# Patient Record
Sex: MALE | URBAN - METROPOLITAN AREA
[De-identification: names, ages, dates, MRNs, and addresses within clinical notes are randomized per-mention and may not be internally consistent; named-entity substitution may affect disease eponyms.]

---

## 2020-11-05 ENCOUNTER — PATIENT OUTREACH (OUTPATIENT)
Dept: CARE COORDINATION | Facility: CLINIC | Age: 70
End: 2020-11-05

## 2020-11-05 NOTE — LETTER
[facility] CARE COORDINATION  November 6, 2020    Santi Laird  [id number] [facility] [id number]      Dear Santi,    I am a clinical product navigator that works on behalf of [facility] as a liaison between our clinical care teams and insurance health plans.  Our goal is to assure our patients have access to all of their primary and specialty care needs as well as additional system resources, such as: Diabetes Education, Medication Therapy Management, and Clinic Care Coordination, among other resources.     We noticed that you are due for your annual wellness visit.  Even if you are feeling well, we encourage you to have an annual check up and assure your health care needs stay on track.      Please contact the clinic at your convenience to schedule your annual wellness exam; if you are interested  or in need of establishing care with any specialty providers or resources including those described above, I am happy to assist you.    I look forward to helping you connect with providers within our health care system; please let me know if you have any questions.     Sincerely,    Mara Causey RN   Clinical Product Navigator  PH: [phone]

## 2020-11-06 NOTE — PROGRESS NOTES
Clinical Product Navigator RN reviewed chart; patient on payer product coverage.  Review results: patient has no identified primary care provider, noted to be overdue for preventive care exam and many care gaps.     Reminder letter/Introduction to Clinical Product Navigator sent.     Mara Causey RN/Clinical Product Navigator

## 2024-02-29 ENCOUNTER — TRANSFERRED RECORDS (OUTPATIENT)
Dept: HEALTH INFORMATION MANAGEMENT | Facility: CLINIC | Age: 74
End: 2024-02-29